# Patient Record
Sex: FEMALE | Race: WHITE | Employment: FULL TIME | URBAN - METROPOLITAN AREA
[De-identification: names, ages, dates, MRNs, and addresses within clinical notes are randomized per-mention and may not be internally consistent; named-entity substitution may affect disease eponyms.]

---

## 2022-02-14 ENCOUNTER — TELEPHONE (OUTPATIENT)
Dept: PHARMACY | Age: 57
End: 2022-02-14

## 2022-03-03 ENCOUNTER — HOSPITAL ENCOUNTER (OUTPATIENT)
Dept: PHARMACY | Age: 57
Setting detail: THERAPIES SERIES
Discharge: HOME | End: 2022-03-03
Payer: COMMERCIAL

## 2022-03-03 DIAGNOSIS — Z95.2 HX OF AORTIC VALVE REPLACEMENT: ICD-10-CM

## 2022-03-03 LAB
INR BLD: 2.2
PROTIME: 25.8 SECONDS

## 2022-03-03 PROCEDURE — 99202 OFFICE O/P NEW SF 15 MIN: CPT

## 2022-03-03 PROCEDURE — 85610 PROTHROMBIN TIME: CPT

## 2022-03-03 NOTE — PROGRESS NOTES
Management Service, Warfarin Management  [facility] Medication Management, [phone]  Visit Date: 3/3/2022   Subjective:   Elian Hines is a 55 y.o. female who presents to clinic today for anticoagulation monitoring and adjustment. Initial visit within a [address] [place] [address] facility for warfarin     Patient was referred for warfarin management due to  Indication:   AVR - on-x valve replacement. INR goal: of 2.0-3.0  -waiting on confirmation for Dr. Donaldson New office . Duration of therapy: indefinite.     Patient reports the following:   Adherent with regimen:  Yes  Missed or extra doses:  none  Bleeding or thromboembolic side effects:  None  Significant medication, dietary, alcohol, or tobacco changes: The patient is losing weight  Significant recent illness, disease state changes, or hospitalization:  None  Upcoming surgeries or procedures:  None           Assessment and PLAN   PT/INR done in office per protocol. INR today is 2.2, therapeutic. Patient believes they normally like her closer to 3 but we are confirming with previous office and provider what patient specific goal will be     Plan:  Instructed the patient to take a boost dose tonight of 9mg; then continue on her current warfarin regimen of 9 mg Wed, Sat and 6 mg on all other days. Using warfarin 3 mg tablets.     Recheck INR in 3 week(s).      Patient verbalized understanding of dosing directions and information discussed. Dosing schedule given to patient. Progress note sent to referring office. Patient acknowledges working in consult agreement with pharmacist as referred by his/her physician.    [address] Tracking Only     · Intervention Detail:   · Total # of Interventions Recommended: 1  · Total # of Interventions Accepted: 1  · Time Spent (min): 20      Nena Barbosa PharmD  3/3/2022 4:53 PM

## 2022-03-24 ENCOUNTER — HOSPITAL ENCOUNTER (OUTPATIENT)
Dept: PHARMACY | Age: 57
Setting detail: THERAPIES SERIES
Discharge: HOME | End: 2022-03-24
Payer: COMMERCIAL

## 2022-03-24 LAB
INR BLD: 1.9
PROTIME: 23.1 SECONDS

## 2022-03-24 PROCEDURE — 99211 OFF/OP EST MAY X REQ PHY/QHP: CPT

## 2022-03-24 PROCEDURE — 85610 PROTHROMBIN TIME: CPT

## 2022-03-24 NOTE — PROGRESS NOTES
Management Service, Warfarin Management  [facility] Medication Management, [phone]  Visit Date: 3/24/2022   Subjective:   Beto Thorne a 55 y. o. female who presents to clinic today for anticoagulation monitoring and adjustment. Initial visit within a [address] [place] [address] facility for warfarin     Patient was referred for warfarin management due to  [address] - on-x valve replacement. INR goal: of 2.0-3.0  -Dr Wilber Mccartney office would like more specifically between 2.0& 2.5   Duration of therapy: indefinite.     Patient reports the following:   Adherent with regimen:  Yes  Missed or extra doses:  none  Bleeding or thromboembolic side effects:  None  Significant medication, dietary, alcohol, or tobacco changes:  The patient is losing weight  Significant recent illness, disease state changes, or hospitalization:  None  Upcoming surgeries or procedures:  None           Assessment and PLAN   PT/INR done in office per protocol.     INR today is 1.9, sub-therapeutic. Plan: Going to increase patient warfarin regimen by ~12% to maintain INR within range. New dosage of warfarin 6mg MWF; 9mg all other days of the week. Using warfarin 3 mg tablets.     Recheck INR in 3 week(s).      Patient verbalized understanding of dosing directions and information discussed. Dosing schedule given to patient. Progress note sent to referring office. Patient acknowledges working in consult agreement with pharmacist as referred by his/her physician.             For Pharmacy Admin Tracking Only     · Intervention Detail:   · Total # of Interventions Recommended: 1  · Total # of Interventions Accepted: 1  · Time Spent (min): 20        Emilia Chin.  Vidal Barbosa  3/24/2022 2:56 PM

## 2022-04-15 ENCOUNTER — HOSPITAL ENCOUNTER (OUTPATIENT)
Dept: PHARMACY | Age: 57
Setting detail: THERAPIES SERIES
Discharge: HOME | End: 2022-04-15
Payer: COMMERCIAL

## 2022-04-15 DIAGNOSIS — Z95.2 HX OF AORTIC VALVE REPLACEMENT: Primary | ICD-10-CM

## 2022-04-15 LAB
INR BLD: 3.2
PROTIME: 37.9 SECONDS

## 2022-04-15 PROCEDURE — 99212 OFFICE O/P EST SF 10 MIN: CPT

## 2022-04-15 PROCEDURE — 85610 PROTHROMBIN TIME: CPT

## 2022-04-15 NOTE — PROGRESS NOTES
Medication Management Service, Warfarin Management  [facility] Medication Management, [phone]  Visit Date: 4/15/2022   Subjective:   Angela Subramanian is a 55 y.o. female who presents to clinic today for anticoagulation monitoring and adjustment. Patient was referred for warfarin management due to  Indication:   valve replacement. INR goal: of 2.0 to 2.5. Duration of therapy: indefinite. Patient reports the following:   Adherent with regimen:  Yes  Missed or extra doses:  None   Bleeding or thromboembolic side effects:  None  Significant medication, dietary, alcohol, or tobacco changes:  None  Significant recent illness, disease state changes, or hospitalization:  None  Upcoming surgeries or procedures:  None           Assessment and PLAN   PT/INR done in office per protocol. INR today is 3.2, supratherapeutic. Plan:  Instructed the patient to take warfarin 3 mg today and then start a reduced weekly warfarin regimen of 9 mg Mon, Wed, Fri and 6 mg on all other days. Using warfarin 3 mg tablets. Recheck INR in 2 week(s). Patient verbalized understanding of dosing directions and information discussed. Dosing schedule given to patient. Progress note sent to referring office. Patient acknowledges working in consult agreement with pharmacist as referred by his/her physician.       Electronically signed by Serena Gan, [address] [place] [address] on 4/15/22 at 10:11 AM EDT    For Pharmacy Admin Tracking Only     Intervention Detail: Dose Adjustment: 1, reason: Therapy Optimization   Total # of Interventions Recommended: 1   Total # of Interventions Accepted: 1   Time Spent (min): 15

## 2022-04-29 ENCOUNTER — HOSPITAL ENCOUNTER (OUTPATIENT)
Dept: PHARMACY | Age: 57
Setting detail: THERAPIES SERIES
Discharge: HOME | End: 2022-04-29
Payer: COMMERCIAL

## 2022-04-29 DIAGNOSIS — Z95.2 HX OF AORTIC VALVE REPLACEMENT: Primary | ICD-10-CM

## 2022-04-29 LAB — INR BLD: 3.2

## 2022-04-29 PROCEDURE — 85610 PROTHROMBIN TIME: CPT

## 2022-04-29 PROCEDURE — 99212 OFFICE O/P EST SF 10 MIN: CPT

## 2022-04-29 NOTE — PROGRESS NOTES
Medication Management Service, Warfarin Management  [facility] Medication Management, [phone]  Visit Date: 4/29/2022   Subjective:   Delilah Artis is a 55 y.o. female who presents to clinic today for anticoagulation monitoring and adjustment. Patient was referred for warfarin management due to  Indication:   AVR. INR goal: of 2.0 to 2.5. Duration of therapy: indefinite. Patient reports the following:   Adherent with regimen:  Yes  Missed or extra doses:  None   Bleeding or thromboembolic side effects: The patient has a bruise on her inner arm from yardwork. The bruise dose appear to be improving. Significant medication, dietary, alcohol, or tobacco changes:  None  Significant recent illness, disease state changes, or hospitalization:  None  Upcoming surgeries or procedures:  None           Assessment and PLAN   PT/INR done in office per protocol. INR today is 3.2, supratherapeutic. Plan:  Instructed the patient to start a decreased warfarin regimen of 9 mg Mon, Thur and 6 mg on all other days. Using warfarin 3 mg tablets. Recheck INR in 2 week(s). Patient verbalized understanding of dosing directions and information discussed. Dosing schedule given to patient. Progress note sent to referring office. Patient acknowledges working in consult agreement with pharmacist as referred by his/her physician.       Electronically signed by ERI Vanegas [facility] on 4/29/22 at 3:25 PM EDT    For Pharmacy Admin Tracking Only     Intervention Detail: Dose Adjustment: 1, reason: Therapy Optimization   Total # of Interventions Recommended: 1   Total # of Interventions Accepted: 1   Time Spent (min): 15

## 2022-05-12 ENCOUNTER — HOSPITAL ENCOUNTER (OUTPATIENT)
Dept: PHARMACY | Age: 57
Setting detail: THERAPIES SERIES
Discharge: HOME | End: 2022-05-12
Payer: COMMERCIAL

## 2022-05-12 DIAGNOSIS — Z95.2 HX OF AORTIC VALVE REPLACEMENT: Primary | ICD-10-CM

## 2022-05-12 LAB
INR BLD: 2.6
PROTIME: 31 SECONDS

## 2022-05-12 PROCEDURE — 85610 PROTHROMBIN TIME: CPT

## 2022-05-12 PROCEDURE — 99211 OFF/OP EST MAY X REQ PHY/QHP: CPT

## 2022-05-12 NOTE — PROGRESS NOTES
Medication Management Service, Warfarin Management  [facility] Medication Management, [phone]  Visit Date: 5/12/2022   Subjective:   Martin Rogel is a 55 y.o. female who presents to clinic today for anticoagulation monitoring and adjustment.     Patient was referred for warfarin management due to  Indication:   AVR. INR goal: of 2.0 to 2.5. Duration of therapy: indefinite.     Patient reports the following:   Adherent with regimen:  Yes  Missed or extra doses:  None   Bleeding or thromboembolic side effects: The patient has a bruise on her inner arm from yardwork. The bruise dose appear to be improving. Significant medication, dietary, alcohol, or tobacco changes:  None  Significant recent illness, disease state changes, or hospitalization:  None  Upcoming surgeries or procedures:  None           Assessment and PLAN   PT/INR done in office per protocol. INR today is 2.6, slightly supra-therapeutic for provider specific goal .     Plan:  Instructed the patient to continue warfarin regimen of 9 mg Mon, Thur and 6 mg on all other days. Using warfarin 3 mg tablets.     Recheck INR in 4 week(s).      Patient verbalized understanding of dosing directions and information discussed. Dosing schedule given to patient. Progress note sent to referring office. Patient acknowledges working in consult agreement with pharmacist as referred by his/her physician.      [address] [place] [address] Tracking Only     · Intervention Detail: Dose Adjustment: 0, reason: Therapy Optimization  · Total # of Interventions Recommended: 0  · Total # of Interventions Accepted: 0  · Time Spent (min): 15      Ganesh Barbosa,   PharmD  5/12/2022 12:59 PM

## 2022-06-09 ENCOUNTER — HOSPITAL ENCOUNTER (OUTPATIENT)
Dept: PHARMACY | Age: 57
Setting detail: THERAPIES SERIES
Discharge: HOME | End: 2022-06-09
Payer: COMMERCIAL

## 2022-06-09 DIAGNOSIS — Z95.2 HX OF AORTIC VALVE REPLACEMENT: Primary | ICD-10-CM

## 2022-06-09 LAB
INR BLD: 2.3
PROTIME: 28.2 SECONDS

## 2022-06-09 PROCEDURE — 99212 OFFICE O/P EST SF 10 MIN: CPT

## 2022-06-09 PROCEDURE — 85610 PROTHROMBIN TIME: CPT

## 2022-06-09 RX ORDER — WARFARIN SODIUM 6 MG/1
TABLET ORAL
Qty: 100 TABLET | Refills: 1 | Status: SHIPPED | OUTPATIENT
Start: 2022-06-09 | End: 2022-06-09 | Stop reason: DRUGHIGH

## 2022-06-09 RX ORDER — WARFARIN SODIUM 6 MG/1
TABLET ORAL
Qty: 100 TABLET | Refills: 1 | Status: SHIPPED | OUTPATIENT
Start: 2022-06-09 | End: 2022-06-09 | Stop reason: SDUPTHER

## 2022-06-09 RX ORDER — WARFARIN SODIUM 6 MG/1
TABLET ORAL
Qty: 100 TABLET | Refills: 1 | OUTPATIENT
Start: 2022-06-09

## 2022-06-09 NOTE — PROGRESS NOTES
Medication Management Service, Warfarin Management  [facility] Medication Management, [phone]  Visit Date: 6/9/2022   Subjective:   John Alaniz is a 55 y.o. female who presents to clinic today for anticoagulation monitoring and adjustment. Patient was referred for warfarin management due to  Indication:   AVR. INR goal: of 2.0 to 2.5. Duration of therapy: indefinite. Patient reports the following:   Adherent with regimen:  Yes  Missed or extra doses:  None   Bleeding or thromboembolic side effects: The patient did have a fall and bruised afterwards. The bruises have improved as of now. Significant medication, dietary, alcohol, or tobacco changes:  None  Significant recent illness, disease state changes, or hospitalization:  None  Upcoming surgeries or procedures:  None           Assessment and PLAN   PT/INR done in office per protocol. INR today is 2.3, therapeutic. Plan:  Instructed the patient to continue the current warfarin regimen of 9 mg Mon, Thur and 6 mg on all other days. Using warfarin 3 mg tablets. Recheck INR in 4 week(s). Patient verbalized understanding of dosing directions and information discussed. Dosing schedule given to patient. Progress note sent to referring office. Patient acknowledges working in consult agreement with pharmacist as referred by his/her physician.       Electronically signed by Branden Connolly, [facility] on 6/9/22 at 11:44 AM EDT    For Pharmacy Admin Tracking Only     Intervention Detail: Refill(s) Provided   Total # of Interventions Recommended: 1   Total # of Interventions Accepted: 1   Time Spent (min): 15

## 2022-07-07 ENCOUNTER — HOSPITAL ENCOUNTER (OUTPATIENT)
Dept: PHARMACY | Age: 57
Setting detail: THERAPIES SERIES
Discharge: HOME | End: 2022-07-07
Payer: COMMERCIAL

## 2022-07-07 DIAGNOSIS — Z95.2 HX OF AORTIC VALVE REPLACEMENT: Primary | ICD-10-CM

## 2022-07-07 LAB — INR BLD: 2.6

## 2022-07-07 PROCEDURE — 99211 OFF/OP EST MAY X REQ PHY/QHP: CPT

## 2022-07-07 PROCEDURE — 85610 PROTHROMBIN TIME: CPT

## 2022-07-07 NOTE — PROGRESS NOTES
Medication Management Service, Warfarin Management  [facility] Medication Management, [phone]  Visit Date: 7/7/2022   Subjective:   Gee Macdonald is a 55 y.o. female who presents to clinic today for anticoagulation monitoring and adjustment. Patient was referred for warfarin management due to  Indication:   mechanical aortic valve. INR goal: of 2.0-2.5. Duration of therapy: indefinite. Patient reports the following:   Adherent with regimen:  Yes  Missed or extra doses:  None   Bleeding or thromboembolic side effects:  None  Significant medication, dietary, alcohol, or tobacco changes:  None  Significant recent illness, disease state changes, or hospitalization:  None  Upcoming surgeries or procedures:  None           Assessment and PLAN   PT/INR done in office per protocol. INR today is 2.6, slightly supratherapeutic. Plan:  Instructed the patient to continue the current warfarin regimen of 9 mg Mon, Thur and 6 mg on all other days. Using warfarin 3 mg tablets. Recheck INR in 4 week(s). Patient verbalized understanding of dosing directions and information discussed. Dosing schedule given to patient. Progress note sent to referring office. Patient acknowledges working in consult agreement with pharmacist as referred by his/her physician.       Electronically signed by Juan Frank [facility] on 7/7/22 at 11:50 AM EDT

## 2022-08-08 ENCOUNTER — HOSPITAL ENCOUNTER (OUTPATIENT)
Dept: PHARMACY | Age: 57
Setting detail: THERAPIES SERIES
Discharge: HOME | End: 2022-08-08
Payer: COMMERCIAL

## 2022-08-08 DIAGNOSIS — Z95.2 HX OF AORTIC VALVE REPLACEMENT: Primary | ICD-10-CM

## 2022-08-08 LAB
INR BLD: 2.5
PROTIME: 30.4 SECONDS

## 2022-08-08 PROCEDURE — 85610 PROTHROMBIN TIME: CPT

## 2022-08-08 PROCEDURE — 99211 OFF/OP EST MAY X REQ PHY/QHP: CPT

## 2022-08-08 NOTE — PROGRESS NOTES
Medication Management Service, Warfarin Management  [facility] Medication Management, [phone]  Visit Date: 8/8/2022   Subjective:   Denise Cobb is a 55 y.o. female who presents to clinic today for anticoagulation monitoring and adjustment. Patient was referred for warfarin management due to  Indication:    AVR . INR goal: of  2 to 2.5 . Duration of therapy: indefinite. Patient reports the following:   Adherent with regimen:  Yes  Missed or extra doses: The patient reports missing a dose about a week to 2 weeks ago but took it the next day. Bleeding or thromboembolic side effects:  None  Significant medication, dietary, alcohol, or tobacco changes: The patient was on the \"Reset\" cleanse last week for 2-3 days. The patient normally does have vit K foods daily. Significant recent illness, disease state changes, or hospitalization:  None  Upcoming surgeries or procedures:  None           Assessment and PLAN   PT/INR done in office per protocol. INR today is 2.5, therapeutic. Plan:  Instructed the patient to continue the current warfarin regimen of 9 mg Mon, Thur, and 6 mg on all other days. Using warfarin 3 mg and 6 mg tablets. Recheck INR in 5 week(s). Patient verbalized understanding of dosing directions and information discussed. Dosing schedule given to patient. Progress note sent to referring office. Patient acknowledges working in consult agreement with pharmacist as referred by his/her physician.       Electronically signed by Allan Regalado [facility] on 8/8/22 at 1:03 PM EDT    For Pharmacy Admin Tracking Only    Intervention Detail:   Total # of Interventions Recommended: 0  Total # of Interventions Accepted: 0  Time Spent (min): 15

## 2022-09-15 ENCOUNTER — HOSPITAL ENCOUNTER (OUTPATIENT)
Dept: PHARMACY | Age: 57
Setting detail: THERAPIES SERIES
Discharge: HOME | End: 2022-09-15
Payer: COMMERCIAL

## 2022-09-15 DIAGNOSIS — Z95.2 HX OF AORTIC VALVE REPLACEMENT: Primary | ICD-10-CM

## 2022-09-15 LAB
INR BLD: 2.2
PROTIME: 26.8 SECONDS

## 2022-09-15 PROCEDURE — 99211 OFF/OP EST MAY X REQ PHY/QHP: CPT

## 2022-09-15 PROCEDURE — 85610 PROTHROMBIN TIME: CPT

## 2022-09-15 NOTE — PROGRESS NOTES
Medication Management Service, Warfarin Management  [facility] Medication Management, [phone]  Visit Date: 9/15/2022   Subjective:   Sania Sanchez is a 55 y.o. female who presents to clinic today for anticoagulation monitoring and adjustment. Patient was referred for warfarin management due to  Indication:   bio prosthetic valve replacement. INR goal: of  2.0-2.5 . Duration of therapy: indefinite. Patient reports the following:   Adherent with regimen:  Yes  Missed or extra doses:  None   Bleeding or thromboembolic side effects:  None  Significant medication, dietary, alcohol, or tobacco changes:  None  Significant recent illness, disease state changes, or hospitalization:  None  Upcoming surgeries or procedures:  None           Assessment and PLAN   PT/INR done in office per protocol. INR today is 2.2, therapeutic. Plan:  Instructed the patient to continue the current warfarin regimen of 9 mg Mon, Thur, and 6 mg on all other days. Using warfarin 3 mg tablets. Recheck INR in 6 week(s). Patient verbalized understanding of dosing directions and information discussed. Dosing schedule given to patient. Progress note sent to referring office. Patient acknowledges working in consult agreement with pharmacist as referred by his/her physician.       Electronically signed by Katina Disla [facility] on 9/15/22 at 12:57 PM EDT

## 2022-10-28 ENCOUNTER — HOSPITAL ENCOUNTER (OUTPATIENT)
Dept: PHARMACY | Age: 57
Setting detail: THERAPIES SERIES
Discharge: HOME | End: 2022-10-28
Payer: COMMERCIAL

## 2022-10-28 DIAGNOSIS — Z95.2 HX OF AORTIC VALVE REPLACEMENT: Primary | ICD-10-CM

## 2022-10-28 LAB
INR BLD: 3
PROTIME: 36.1 SECONDS

## 2022-10-28 PROCEDURE — 99211 OFF/OP EST MAY X REQ PHY/QHP: CPT

## 2022-10-28 PROCEDURE — 85610 PROTHROMBIN TIME: CPT

## 2022-10-28 NOTE — PROGRESS NOTES
Medication Management Service, Warfarin Management  [facility] Medication Management, [phone]  Visit Date: 10/28/2022   Subjective:   Rafal Campos is a 65 y.o. female who presents to clinic today for anticoagulation monitoring and adjustment. Patient was referred for warfarin management due to  Indication:   bio prosthetic valve replacement. INR goal: of  2.0-2.5 . Duration of therapy: indefinite. Patient reports the following:   Adherent with regimen:  Yes  Missed or extra doses:  None   Bleeding or thromboembolic side effects:  None  Significant medication, dietary, alcohol, or tobacco changes:  None  Significant recent illness, disease state changes, or hospitalization:  None  Upcoming surgeries or procedures:  None           Assessment and PLAN   PT/INR done in office per protocol. INR today is 3.0, supratherapeutic. Patient recently had COVID and is currently on a steroid for cough. Plan:  Instructed the patient to start a decreased warfarin regimen of 6 mg daily. Using warfarin 3 mg tablets. Recheck INR in 2 week(s). Patient verbalized understanding of dosing directions and information discussed. Dosing schedule given to patient. Progress note sent to referring office. Patient acknowledges working in consult agreement with pharmacist as referred by his/her physician.       Electronically signed by ERI [facility] on 9/15/22 at 12:57 PM EDT

## 2022-11-23 ENCOUNTER — HOSPITAL ENCOUNTER (OUTPATIENT)
Dept: PHARMACY | Age: 57
Setting detail: THERAPIES SERIES
Discharge: HOME | End: 2022-11-23

## 2022-11-23 DIAGNOSIS — Z95.2 HX OF AORTIC VALVE REPLACEMENT: Primary | ICD-10-CM

## 2022-11-23 LAB
INR BLD: 1.8
PROTIME: 21.8 SECONDS

## 2022-12-19 ENCOUNTER — TELEPHONE (OUTPATIENT)
Dept: PHARMACY | Age: 57
End: 2022-12-19

## 2022-12-19 NOTE — TELEPHONE ENCOUNTER
Patient is overdue for an appointment. It appears she has established with another clinic.   Resolving episode and closing referral.

## 2024-11-29 ENCOUNTER — HOSPITAL ENCOUNTER (OUTPATIENT)
Dept: PHYSICAL THERAPY | Facility: CLINIC | Age: 59
Setting detail: THERAPIES SERIES
Discharge: HOME | End: 2024-11-29
Payer: COMMERCIAL

## 2024-11-29 PROCEDURE — 97161 PT EVAL LOW COMPLEX 20 MIN: CPT

## 2024-11-29 PROCEDURE — 97140 MANUAL THERAPY 1/> REGIONS: CPT

## 2024-11-29 PROCEDURE — 97110 THERAPEUTIC EXERCISES: CPT

## 2024-11-29 NOTE — CONSULTS
PLEASE REVIEW AND SIGN THE ATTACHED PHYSICAL THERAPY EVALUATION/PLAN OF CARE OR PROGRESS REPORT AND FAX BACK [phone]      [x] [facility]  Outpatient Rehabilitation &  Therapy  [address]  P: [phone]  F: [phone]     Physical Therapy Lower Extremity Evaluation    Date:  2024  Patient: Abbey Romo  : 1965  MRN: [id number]  Physician:     Sergey Flores DPM   Insurance: MMO, visits  no auth req, ded 3500/met, 20% co-ins, oop 8000/met   Medical Diagnosis: M72.2 (ICD-10-CM) - Plantar fascial fibromatosis     Rehab Codes: M79.761- Right foot pain  Onset date: 2024    Next 's appt.: Pending    Subjective:   CC: Ms. Romo is a pleasant 60 y/o female who reports pain related to plantar fasciitis occurring over the past 4-6 months.  She currently is very active and participates in cross fit training 4-5 days/ week and reports participating in resistance training, running, and plyometrics.  She reports that her pain is worst in the morning and improves as the day goes along.  She has been actively using night splints and inserts, but denies improvements.  She denies numbness or tingling and notes that she would like to return to full cross fit training without restriction due to her foot pain.   HPI: 2024    PMHx: [] Unremarkable [] Diabetes [] HTN  [] Pacemaker   [x] MI/Heart Problems [] Cancer [] Arthritis [x] Other: Hx of seizures (on medication), carpal tunnel, presence of pacemaker,               [x] Refer to full medical chart  In EPIC       Comorbidities:   [] Obesity [] Dialysis  [] N/A   [] Asthma/COPD [] Dementia [x] Other:   [] Stroke [] Sleep apnea [] Other:   [] Vascular disease [] Rheumatic disease [] Other:     Tests: [] X-Ray: [] MRI:  [x] Other: Unrelated to current condition    Medications: [x] Refer to full medical record [] None [] Other:  Allergies:      [x] Refer to full medical record [] None [] Other:    Function  Home

## 2024-12-10 ENCOUNTER — HOSPITAL ENCOUNTER (OUTPATIENT)
Dept: PHYSICAL THERAPY | Facility: CLINIC | Age: 59
Setting detail: THERAPIES SERIES
Discharge: HOME | End: 2024-12-10
Payer: COMMERCIAL

## 2024-12-10 PROCEDURE — 97110 THERAPEUTIC EXERCISES: CPT

## 2024-12-10 PROCEDURE — 97140 MANUAL THERAPY 1/> REGIONS: CPT

## 2024-12-10 NOTE — FLOWSHEET NOTE
[x] [facility]  Outpatient Rehabilitation &  Therapy  [address]  P: [phone]  F: [phone]     Physical Therapy Daily Treatment Note    Date:  12/10/2024  Patient Name:  Abbey Romo    :  1965  MRN: [id number]  Physician:     Sergey Flores DPM   Insurance: MMO, visits 40/ no auth req, ded 3500/met, 20% co-ins, oop 8000/met   Medical Diagnosis: M72.2 (ICD-10-CM) - Plantar fascial fibromatosis                     Rehab Codes: M79.761- Right foot pain  Onset date: 2024                       Next Dr's appt.: Pending  Visit# / total visits:      Cancels/No Shows: 0/0    Subjective:  Pt reports that she has been sick the past few days and has not been as active due to sickness; reports that her foot pain is improved.  Pain:  [x] Yes  [] No Location: R foot N/A Pain Rating: (0-10 scale) 4/10  Pain altered Tx:  [x] No  [] Yes  Action:  Comments:    Objective:  Modalities: No e-stim or tens  Precautions: Presence of pacemaker, hx of seizures (has not occurred within past years)   Exercises:  Exercise Performed Reps/ Time Weight/ Level Comments   Elliptical x 8' L1    SB calf stretch x 3x30\"     Soleus stretch x 3x30\" ea            Clamshell x x30 Lime TB    SL hip abduction x x20 Lime TB    Bridge x 3x10 Lime TB           Towel scrunch x x20     Arch lift x x20     Toe yoga x x20            Lateral band walk x 3x10 steps Lime TB                                                                   Other:    Manual therapy:   15'  STM to lateral plantar fascia  STM to lateral calf and soleus musculature     Specific Instructions for next treatment: Manual therapy PRN with foot intrinsic/ mobility and hip strength/ endurance as needed      Treatment Charges: Mins Units Time In/Out   []  Modalities        [x]  Ther Exercise 39 3 4:06-4:45 PM   []  Neuromuscular Re-ed      []  Gait Training      [x]  Manual Therapy 15 1 3:51-4:06 PM   []  Ther Activities      []  Aquatics

## 2024-12-13 ENCOUNTER — HOSPITAL ENCOUNTER (OUTPATIENT)
Dept: PHYSICAL THERAPY | Facility: CLINIC | Age: 59
Setting detail: THERAPIES SERIES
Discharge: HOME | End: 2024-12-13
Payer: COMMERCIAL

## 2024-12-13 PROCEDURE — 97110 THERAPEUTIC EXERCISES: CPT

## 2024-12-13 PROCEDURE — 97140 MANUAL THERAPY 1/> REGIONS: CPT

## 2024-12-13 NOTE — FLOWSHEET NOTE
[x] [facility]  Outpatient Rehabilitation &  Therapy  [address]  P: [phone]  F: [phone]     Physical Therapy Daily Treatment Note    Date:  2024  Patient Name:  Abbey Romo    :  1965  MRN: [id number]  Physician:     Sergey Flores DPM   Insurance: MMO, visits 40/ no auth req, ded 3500/met, 20% co-ins, oop 8000/met   Medical Diagnosis: M72.2 (ICD-10-CM) - Plantar fascial fibromatosis                     Rehab Codes: M79.761- Right foot pain  Onset date: 2024                       Next Dr's appt.: Pending  Visit# / total visits: 3/16     Cancels/No Shows: 0/0    Subjective:  Pt reports that she is starting to get over a cold and plans to go back to crossfit this weekend or next week.  Pain:  [x] Yes  [] No Location: R foot N/A Pain Rating: (0-10 scale) 3/10  Pain altered Tx:  [x] No  [] Yes  Action:  Comments:    Objective:  Modalities: No e-stim or tens  Precautions: Presence of pacemaker, hx of seizures (has not occurred within past years)   Exercises:  Exercise Performed Reps/ Time Weight/ Level Comments   Aidryne bike x 8'     Elliptical  8' L1    SB calf stretch x 3x30\"     Soleus stretch x 3x30\" ea     Hamstring stretch x 3x30\" ea            Clamshell x x30 Lime TB    SL hip abduction x x30 Lime TB    Bridge x 3x10 Lime TB           Towel scrunch x x20     Arch lift x x20     Toe yoga x x20            Lateral band walk x 3x10 steps Lime TB    3-way kick x X10 ea Lime TB                                                            Other:    Manual therapy:   10'  STM to lateral plantar fascia  STM to lateral calf and soleus musculature  Grade IV posterior talus glide     Specific Instructions for next treatment: Manual therapy PRN with foot intrinsic/ mobility and hip strength/ endurance as needed      Treatment Charges: Mins Units Time In/Out   []  Modalities        [x]  Ther Exercise 44 3 7:12-7:56 AM   []  Neuromuscular Re-ed      []  Gait

## 2024-12-17 ENCOUNTER — HOSPITAL ENCOUNTER (OUTPATIENT)
Dept: PHYSICAL THERAPY | Facility: CLINIC | Age: 59
Setting detail: THERAPIES SERIES
Discharge: HOME | End: 2024-12-17
Payer: COMMERCIAL

## 2024-12-17 PROCEDURE — 97110 THERAPEUTIC EXERCISES: CPT

## 2024-12-17 NOTE — FLOWSHEET NOTE
[x] [facility]  Outpatient Rehabilitation &  Therapy  [address]  P: [phone]  F: [phone]     Physical Therapy Daily Treatment Note    Date:  2024  Patient Name:  Abbey Romo    :  1965  MRN: [id number]  Physician:     Sergey Flores DPM   Insurance: MMO, visits / no auth req, ded 3500/met, 20% co-ins, oop 8000/met   Medical Diagnosis: M72.2 (ICD-10-CM) - Plantar fascial fibromatosis                     Rehab Codes: M79.761- Right foot pain  Onset date: 2024                       Next Dr's appt.: Pending  Visit# / total visits:      Cancels/No Shows: 0/0    Subjective:  Pt reports that her pain is decreased today and that she was able to attend crossfit this weekend without difficulty.   Pain:  [x] Yes  [] No Location: R foot N/A Pain Rating: (0-10 scale) 2/10  Pain altered Tx:  [x] No  [] Yes  Action:  Comments:    Objective:  Modalities: No e-stim or tens  Precautions: Presence of pacemaker, hx of seizures (has not occurred within past years)   Exercises:  Exercise Performed Reps/ Time Weight/ Level Comments   Aidryne bike x 8'     Elliptical  8' L1    SB calf stretch x 3x30\"     Soleus stretch x 3x30\" ea     Hamstring stretch x 3x30\" ea            Clamshell x x30 Lime TB    SL hip abduction x x30 Lime TB    Bridge x 3x10 Lime TB    SL bridge x X10 ea  10\" hold          Towel scrunch  x20     Arch lift  x20     Toe yoga  x20            Lateral band walk x 3x10 steps Lime TB    3-way kick x X15 ea Lime TB                                                            Other:    Manual therapy: held today  10'  STM to lateral plantar fascia  STM to lateral calf and soleus musculature  Grade IV posterior talus glide     Specific Instructions for next treatment: Manual therapy PRN with foot intrinsic/ mobility and hip strength/ endurance as needed      Treatment Charges: Mins Units Time In/Out   []  Modalities        [x]  Ther Exercise 45 3 4:10-4:55 PM

## 2024-12-20 ENCOUNTER — HOSPITAL ENCOUNTER (OUTPATIENT)
Dept: PHYSICAL THERAPY | Facility: CLINIC | Age: 59
Setting detail: THERAPIES SERIES
Discharge: HOME | End: 2024-12-20
Payer: COMMERCIAL

## 2024-12-20 ENCOUNTER — CLINICAL DOCUMENTATION (OUTPATIENT)
Dept: PHYSICAL THERAPY | Facility: CLINIC | Age: 59
End: 2024-12-20

## 2024-12-20 NOTE — THERAPY DISCHARGE
THIS PATIENT HAS BEEN DISCHARGED FROM PHYSICAL THERAPY. HERE IS A COPY FOR YOUR RECORDS. IF YOU HAVE ANY QUESTIONS, PLEASE DO NOT HESITATE TO CALL. THANK YOU FOR THIS REFERRAL      [x] [facility]  Outpatient Rehabilitation &  Therapy  [address]  P: [phone]  F: [phone]     Physical Therapy Discharge Note    Date: 2024      Patient: Abbey Romo  : 1965  MRN: [id number]    Physician:     Sergey Flores DPM   Insurance: MMO, visits  no auth req, ded 3500/met, 20% co-ins, oop 8000/met   Medical Diagnosis: M72.2 (ICD-10-CM) - Plantar fascial fibromatosis                     Rehab Codes: M79.761- Right foot pain  Onset date: 2024                       Next 's appt.: Pending  Visit# / total visits:                                   Cancels/No Shows:   Date of initial visit:                 Date of final visit:        Discharge Status:     Pt called to request to be discharged due to needing to meet a new deductible at the start of the year.  Pt welcome to come back with new referral as needed if she wishes to return to therapy after reaching deductible.             Electronically signed by: ARELIS LEDBETTER PT    If you have any questions or concerns, please don't hesitate to call.  Thank you for your referral.

## 2024-12-20 NOTE — FLOWSHEET NOTE
[x] [facility]  Outpatient Rehabilitation &  Therapy  [address]  P: [phone]  F: [phone]     Therapy Cancel/No Show note    Date: 2024  Patient: Abbey Romo  : 1965  MRN: [id number]    Cancels/No Shows to date:     For today's appointment patient:    [x]  Cancelled    [] Rescheduled appointment    [] No-show     Reason given by patient:    []  Patient ill    []  Conflicting appointment    [] No transportation      [] Conflict with work    [] No reason given    [] Weather related    [] COVID-19    [x] Other:      Comments:  )Pt called to cancel today's appointment and wishes to be discharged due to having to meet a new deductible at the new year.       [] Next appointment was confirmed    Electronically signed by: ARELIS LEDBETTER, PT

## 2024-12-26 NOTE — PROGRESS NOTES
Medication Management Service, Warfarin Management  [facility] Medication Management, [phone]  Visit Date: 1/3/2025   Subjective:   Abbey Rmoo is a 59 y.o. female who presents to clinic today for initial appointment with [facility].  Patient was previously managed by [facility] and was therapeutic on a warfarin regimen of 9mg on [id number], 6mg all other days.       Patient was referred for warfarin management due to  Indication:   atrial fibrillation and History of mechanical aortic valve replacement .   INR goal: of 2.0-3.0.  Duration of therapy: indefinite.    Patient reports the following:   Adherent with regimen:  Yes    Missed or extra doses:  None - patient states that she uses a pillbox at home to organize warfarin, doubts that there would have been a mix up with dosing. Patient states that at [facility] her regimen was 9 mg Monday/Wednesday/Friday/Saturday, 6 mg all other days. Patient states that she self-adjusted dosing to warfarin 9 mg Monday/Wednesday/Friday, 7.5 mg on Saturday/Sunday, and 6 mg Tuesday/Thursday.     Bleeding or thromboembolic side effects:  None    Significant medication, dietary, alcohol, or tobacco changes:   Patient states that she usually has daily salads consisting of either spinach or lettuce mix, but with the holidays she has not eaten these during her lunch. She also states that she did have one beer last night with dinner and this is out of the normal for her as well. Additionally, patient also states that she was sick in December and was on an Augmentin course - but that this was over a month ago. Patient states that she also has to take Augmentin 1000 mg tablet prior to any dental cleaning.      Significant recent illness, disease state changes, or hospitalization:  None    Upcoming surgeries or procedures: Patient states that she also has to take Augmentin 1000 mg tablet prior to any dental cleaning. States

## 2025-01-03 ENCOUNTER — ANTI-COAG VISIT (OUTPATIENT)
Age: 60
End: 2025-01-03
Payer: COMMERCIAL

## 2025-01-03 DIAGNOSIS — I48.91 ATRIAL FIBRILLATION, UNSPECIFIED TYPE (HCC): ICD-10-CM

## 2025-01-03 DIAGNOSIS — Z95.2 HX OF AORTIC VALVE REPLACEMENT: Primary | ICD-10-CM

## 2025-01-03 LAB
INTERNATIONAL NORMALIZATION RATIO, POC: 6.2
PROTHROMBIN TIME, POC: 74.3

## 2025-01-03 PROCEDURE — 99213 OFFICE O/P EST LOW 20 MIN: CPT

## 2025-01-03 PROCEDURE — 85610 PROTHROMBIN TIME: CPT

## 2025-01-03 RX ORDER — UBIDECARENONE/VIT E/VIT E MIX 100-20-15
1 CAPSULE ORAL DAILY
COMMUNITY

## 2025-01-03 RX ORDER — M-VIT,TX,IRON,MINS/CALC/FOLIC 27MG-0.4MG
1 TABLET ORAL DAILY
COMMUNITY

## 2025-01-06 ENCOUNTER — ANTI-COAG VISIT (OUTPATIENT)
Age: 60
End: 2025-01-06
Payer: COMMERCIAL

## 2025-01-06 DIAGNOSIS — Z95.2 HX OF AORTIC VALVE REPLACEMENT: Primary | ICD-10-CM

## 2025-01-06 DIAGNOSIS — I48.91 ATRIAL FIBRILLATION, UNSPECIFIED TYPE (HCC): ICD-10-CM

## 2025-01-06 LAB
INTERNATIONAL NORMALIZATION RATIO, POC: 1.3
PROTHROMBIN TIME, POC: 16

## 2025-01-06 PROCEDURE — 85610 PROTHROMBIN TIME: CPT

## 2025-01-06 PROCEDURE — 99211 OFF/OP EST MAY X REQ PHY/QHP: CPT

## 2025-01-06 NOTE — PROGRESS NOTES
Medication Management Service, Warfarin Management  [facility] Medication Management, [phone]  Visit Date: 2025   Subjective:   Abbey Romo is a 59 y.o. female who presents to clinic today for anticoagulation monitoring and adjustment.    Patient was referred for warfarin management due to  Indication:   atrial fibrillation and aortic valve replacement.   INR goal: of 2.0-3.0.  Duration of therapy: indefinite.    Patient reports the following:   Adherent with regimen:  Yes  Missed or extra doses:  None   Bleeding or thromboembolic side effects:  None  Significant medication, dietary, alcohol, or tobacco changes:  None  Significant recent illness, disease state changes, or hospitalization:  None  Upcoming surgeries or procedures:  None           Assessment and PLAN   PT/INR done in office per protocol.     INR today is 1.3, subtherapeutic.    Plan:  Instructed the patient to continue the current warfarin regimen of 7.5 mg daily.  Using warfarin 6 mg tablets.    Recheck INR in 2 week(s).     Patient verbalized understanding of dosing directions and information discussed. Dosing schedule given to patient. Progress note sent to referring office.  Patient acknowledges working in consult agreement with pharmacist as referred by his/her physician.      Electronically signed by June Bal RPH on 25 at 11:30 AM EST    For Pharmacy Admin Tracking Only    Intervention Detail: Adherence Monitorin  Total # of Interventions Recommended: 0  Total # of Interventions Accepted: 0  Time Spent (min): 15

## 2025-01-21 ENCOUNTER — ANTI-COAG VISIT (OUTPATIENT)
Age: 60
End: 2025-01-21
Payer: COMMERCIAL

## 2025-01-21 DIAGNOSIS — I48.91 ATRIAL FIBRILLATION, UNSPECIFIED TYPE (HCC): ICD-10-CM

## 2025-01-21 DIAGNOSIS — Z95.2 HX OF AORTIC VALVE REPLACEMENT: Primary | ICD-10-CM

## 2025-01-21 LAB
INTERNATIONAL NORMALIZATION RATIO, POC: 2.6
PROTHROMBIN TIME, POC: 31.5

## 2025-01-21 PROCEDURE — 99211 OFF/OP EST MAY X REQ PHY/QHP: CPT

## 2025-01-21 PROCEDURE — 85610 PROTHROMBIN TIME: CPT

## 2025-01-21 NOTE — PROGRESS NOTES
Medication Management Service, Warfarin Management  [facility] Medication Management, [phone]  Visit Date: 2025   Subjective:   Abbey Romo is a 59 y.o. female who presents to clinic today for anticoagulation monitoring and adjustment.    Patient was referred for warfarin management due to  Indication:   atrial fibrillation.   INR goal: of 2.0-3.0.  Duration of therapy: indefinite.    Patient reports the following:   Adherent with regimen:  Yes  Missed or extra doses:  None   Bleeding or thromboembolic side effects:  None  Significant medication, dietary, alcohol, or tobacco changes:  None  Significant recent illness, disease state changes, or hospitalization:  None  Upcoming surgeries or procedures:  None           Assessment and PLAN   PT/INR done in office per protocol.     INR today is 2.6, therapeutic.    Plan:  Instructed the patient to continue the current warfarin regimen of 7.5 mg daily.  Using warfarin 3 mg tablets.    Recheck INR in 4 week(s).     Patient verbalized understanding of dosing directions and information discussed. Dosing schedule given to patient. Progress note sent to referring office.  Patient acknowledges working in consult agreement with pharmacist as referred by his/her physician.      Electronically signed by June Bal RPH on 25 at 11:05 AM EST    For Pharmacy Admin Tracking Only    Intervention Detail: Adherence Monitorin  Total # of Interventions Recommended: 0  Total # of Interventions Accepted: 0  Time Spent (min): 15

## 2025-02-28 ENCOUNTER — ANTI-COAG VISIT (OUTPATIENT)
Age: 60
End: 2025-02-28
Payer: COMMERCIAL

## 2025-02-28 DIAGNOSIS — Z95.2 HX OF AORTIC VALVE REPLACEMENT: Primary | ICD-10-CM

## 2025-02-28 DIAGNOSIS — I48.91 ATRIAL FIBRILLATION, UNSPECIFIED TYPE (HCC): ICD-10-CM

## 2025-02-28 LAB
INTERNATIONAL NORMALIZATION RATIO, POC: 3
PROTHROMBIN TIME, POC: 36.4

## 2025-02-28 PROCEDURE — 85610 PROTHROMBIN TIME: CPT

## 2025-02-28 PROCEDURE — 99211 OFF/OP EST MAY X REQ PHY/QHP: CPT

## 2025-02-28 NOTE — PROGRESS NOTES
Medication Management Service, Warfarin Management  [facility] Medication Management, [phone]  Visit Date: 25  Subjective:   Abbey Romo is a 59 y.o. female who presents to clinic today for anticoagulation monitoring and adjustment.     Patient was referred for warfarin management due to  Indication:   atrial fibrillation.   INR goal: of 2.0-3.0.  Duration of therapy: indefinite.     Patient reports the following:   Adherent with regimen:  Yes  Missed or extra doses:  None   Bleeding or thromboembolic side effects:  None  Significant medication, dietary, alcohol, or tobacco changes:  None  Significant recent illness, disease state changes, or hospitalization:  None  Upcoming surgeries or procedures:  None           Assessment and PLAN   PT/INR done in office per protocol.     INR today is 3.0, therapeutic.     Plan:  Instructed the patient to continue the current warfarin regimen of 7.5 mg daily.  Using warfarin 3 mg tablets.     Recheck INR in 4 week(s).      Patient verbalized understanding of dosing directions and information discussed. Dosing schedule given to patient. Progress note sent to referring office.  Patient acknowledges working in consult agreement with pharmacist as referred by his/her physician.       Electronically signed by Beny Hammonds [facility] on 25 at 9:49AM EST     For Pharmacy Admin Tracking Only     Intervention Detail: Adherence Monitorin  Total # of Interventions Recommended: 0  Total # of Interventions Accepted: 0  Time Spent (min): 15

## 2025-03-26 ENCOUNTER — ANTI-COAG VISIT (OUTPATIENT)
Age: 60
End: 2025-03-26
Payer: COMMERCIAL

## 2025-03-26 DIAGNOSIS — Z95.2 HX OF AORTIC VALVE REPLACEMENT: Primary | ICD-10-CM

## 2025-03-26 DIAGNOSIS — I48.91 ATRIAL FIBRILLATION, UNSPECIFIED TYPE (HCC): ICD-10-CM

## 2025-03-26 LAB
INTERNATIONAL NORMALIZATION RATIO, POC: 4.6
PROTHROMBIN TIME, POC: 55.6

## 2025-03-26 PROCEDURE — 85610 PROTHROMBIN TIME: CPT

## 2025-03-26 PROCEDURE — 99211 OFF/OP EST MAY X REQ PHY/QHP: CPT

## 2025-03-26 NOTE — PROGRESS NOTES
Medication Management Service, Warfarin Management  [facility] Medication Management, [phone]  Visit Date: 3/26/2025   Subjective:   Abbey Romo is a 59 y.o. female who presents to clinic today for anticoagulation monitoring and adjustment.    Patient was referred for warfarin management due to  Indication:   atrial fibrillation.   INR goal: of 2.0-3.0.  Duration of therapy: indefinite.    Patient reports the following:   Adherent with regimen:  Yes  Missed or extra doses:  None   Bleeding or thromboembolic side effects:  None  Significant medication, dietary, alcohol, or tobacco changes:  None  Significant recent illness, disease state changes, or hospitalization:  None  Upcoming surgeries or procedures:   Patient reports \"problem with pacemaker\" awaiting call from cardiologist.            Assessment and PLAN   PT/INR done in office per protocol.     INR today is 4.6, supratherapeutic.    Plan:  Instructed the patient to hold 1 doses, then 7.5 mg Mon, Wed and Fri and 6 mg all other days, which is a decrease of 11%.  Using warfarin 3 mg tablets.    Recheck INR in 1 week(s).     Patient verbalized understanding of dosing directions and information discussed. Dosing schedule given to patient. Progress note sent to referring office.  Patient acknowledges working in consult agreement with pharmacist as referred by his/her physician.      Electronically signed by ANISA BRUNO RPH on 3/26/25 at 7:33 AM EDT    For Pharmacy Admin Tracking Only    Intervention Detail: Adherence Monitorin and Dose Adjustment: 1, reason: Therapy Optimization  Total # of Interventions Recommended: 1  Total # of Interventions Accepted: 1  Time Spent (min): 20

## 2025-03-31 NOTE — PROGRESS NOTES
Medication Management Service, Warfarin Management  [facility] Medication Management, [phone]  Visit Date: 4/1/2025   Subjective:   Abbey Romo is a 59 y.o. female who presents to clinic today for anticoagulation monitoring and adjustment.    Patient was referred for warfarin management due to  Indication:   atrial fibrillation and mechanical aortic valve.   INR goal: of 2.0-3.0.  Duration of therapy: indefinite.    Patient reports the following:   Adherent with regimen:  Yes  Missed or extra doses:   held dose 3/26 as directed at last appointment      Bleeding or thromboembolic side effects:  None    Significant medication, dietary, alcohol, or tobacco changes:  Fully reviewed patient's medication list. Of note, patient mentioned she started taking fish oil this year. This can possibly increase INR. Today is patient's first visit with me personally, after switching to our clinic from [facility] in December. Patient previously reported stable INRs with less frequent monitoring but since coming to this clinic INR has been fairly labile. Patient reports she eats very similar diet each day which does include a salad with spinach for lunch. This is unchanged recently. Denies alcohol intake recently, rarely drinks alcohol. Denies grapefruit or cranberry intake. Does not utilize protein shakes. Has not changed her multivitamin recently.    Significant recent illness, disease state changes, or hospitalization:  None    Upcoming surgeries or procedures:  June - pacemaker removal, will be having leadless pacemaker placed - not scheduled yet . Patient reports for previous pacemaker placement she was off warfarin but did not have to bridge with Lovenox.           Assessment and PLAN   PT/INR done in office per protocol.     INR today is 4, supratherapeutic in spite of dose decrease at last visit.    Plan:  Skip warfarin dose today, then start reduced dose of warfarin 7.5 mg on Mondays and 6 mg all other days.

## 2025-04-01 ENCOUNTER — ANTI-COAG VISIT (OUTPATIENT)
Age: 60
End: 2025-04-01
Payer: COMMERCIAL

## 2025-04-01 DIAGNOSIS — I48.91 ATRIAL FIBRILLATION, UNSPECIFIED TYPE (HCC): ICD-10-CM

## 2025-04-01 DIAGNOSIS — Z95.2 HX OF AORTIC VALVE REPLACEMENT: Primary | ICD-10-CM

## 2025-04-01 LAB
INTERNATIONAL NORMALIZATION RATIO, POC: 4
PROTHROMBIN TIME, POC: 47.8

## 2025-04-01 PROCEDURE — 85610 PROTHROMBIN TIME: CPT

## 2025-04-01 PROCEDURE — 99213 OFFICE O/P EST LOW 20 MIN: CPT

## 2025-04-01 RX ORDER — LORATADINE 10 MG/1
10 TABLET ORAL PRN
COMMUNITY

## 2025-04-01 RX ORDER — MAGNESIUM GLUCONATE 27 MG(500)
500 TABLET ORAL NIGHTLY
COMMUNITY

## 2025-04-01 RX ORDER — BETAMETHASONE DIPROPIONATE 0.5 MG/G
CREAM TOPICAL PRN
COMMUNITY
Start: 2025-02-12

## 2025-04-10 NOTE — PROGRESS NOTES
Medication Management Service, Warfarin Management  [facility] Medication Management, [phone]  Visit Date: 2025   Subjective:   Abbey Romo is a 59 y.o. female who presents to clinic today for anticoagulation monitoring and adjustment.    Patient was referred for warfarin management due to  Indication:   atrial fibrillation and mechanical aortic valve.   INR goal: of 2.0-3.0.  Duration of therapy: indefinite.    Patient reports the following:   Adherent with regimen:  Yes  Missed or extra doses:  None   Bleeding or thromboembolic side effects:  None    Significant medication, dietary, alcohol, or tobacco changes:   patient states she ensured her diet was typical for her since last appointment    Significant recent illness, disease state changes, or hospitalization:  None  Upcoming surgeries or procedures:  None           Assessment and PLAN   PT/INR done in office per protocol.     INR today is 2.4, therapeutic.    Plan:  Instructed the patient to continue the current warfarin regimen of 7.5 mg Mon and 6 mg on all other days.  Using warfarin 3 mg tablets.    Recheck INR in 3 week(s).     Patient verbalized understanding of dosing directions and information discussed. Dosing schedule given to patient. Progress note sent to referring office.  Patient acknowledges working in consult agreement with pharmacist as referred by his/her physician.      Electronically signed by Lynda Euceda RPH on 4/10/25 at 2:00 PM EDT    For Pharmacy Admin Tracking Only    Intervention Detail: Adherence Monitorin  Total # of Interventions Recommended: 1  Total # of Interventions Accepted: 1  Time Spent (min): 20

## 2025-04-11 ENCOUNTER — ANTI-COAG VISIT (OUTPATIENT)
Age: 60
End: 2025-04-11
Payer: COMMERCIAL

## 2025-04-11 DIAGNOSIS — Z95.2 HX OF AORTIC VALVE REPLACEMENT: Primary | ICD-10-CM

## 2025-04-11 DIAGNOSIS — I48.91 ATRIAL FIBRILLATION, UNSPECIFIED TYPE (HCC): ICD-10-CM

## 2025-04-11 LAB
INTERNATIONAL NORMALIZATION RATIO, POC: 2.4
PROTHROMBIN TIME, POC: 28.7

## 2025-04-11 PROCEDURE — 85610 PROTHROMBIN TIME: CPT

## 2025-04-11 PROCEDURE — 99211 OFF/OP EST MAY X REQ PHY/QHP: CPT

## 2025-05-01 NOTE — PROGRESS NOTES
Medication Management Service, Warfarin Management  [facility] Medication Management, [phone]  Visit Date: 2025   Subjective:   Abbey Romo is a 59 y.o. female who presents to clinic today for anticoagulation monitoring and adjustment.    Patient was referred for warfarin management due to  Indication:   atrial fibrillation and mechanical aortic valve.   INR goal: of 2.0-3.0.  Duration of therapy: indefinite.    Patient reports the following:   Adherent with regimen:  Yes  Missed or extra doses:  None   Bleeding or thromboembolic side effects:  None    Significant medication, dietary, alcohol, or tobacco changes:  None. Has been maintaining her salad intake which she will continue.    Significant recent illness, disease state changes, or hospitalization:  None    Upcoming surgeries or procedures:  scheduled for a leadless pacemaker placement  in [place], however she is not sure she is going to do this, she has an appointment upcoming with [facility] and may have a provider there do this procedure, she will let clinic know of decision/plans           Assessment and PLAN   PT/INR done in office per protocol.     INR today is 3, therapeutic.    Plan:  Instructed the patient to continue the current warfarin regimen of 7.5 mg Mon and 6 mg on all other days.  Using warfarin 3 mg tablets.    Recheck INR in 4 week(s).     Patient verbalized understanding of dosing directions and information discussed. Dosing schedule given to patient. Progress note sent to referring office.  Patient acknowledges working in consult agreement with pharmacist as referred by his/her physician.      Electronically signed by Lynda Euceda RPH on 25 at 9:55 AM EDT    For Pharmacy Admin Tracking Only    Intervention Detail: Adherence Monitorin  Total # of Interventions Recommended: 1  Total # of Interventions Accepted: 1  Time Spent (min): 20

## 2025-05-02 ENCOUNTER — ANTI-COAG VISIT (OUTPATIENT)
Age: 60
End: 2025-05-02
Payer: COMMERCIAL

## 2025-05-02 DIAGNOSIS — I48.91 ATRIAL FIBRILLATION, UNSPECIFIED TYPE (HCC): ICD-10-CM

## 2025-05-02 DIAGNOSIS — Z95.2 HX OF AORTIC VALVE REPLACEMENT: Primary | ICD-10-CM

## 2025-05-02 LAB
INTERNATIONAL NORMALIZATION RATIO, POC: 3
PROTHROMBIN TIME, POC: 35.5

## 2025-05-02 PROCEDURE — 99211 OFF/OP EST MAY X REQ PHY/QHP: CPT

## 2025-05-02 PROCEDURE — 85610 PROTHROMBIN TIME: CPT

## 2025-05-29 NOTE — PROGRESS NOTES
Medication Management Service, Warfarin Management  [facility] Medication Management, [phone]  Visit Date: 2025   Subjective:   Abbey Romo is a 59 y.o. female who presents to clinic today for anticoagulation monitoring and adjustment.    Patient was referred for warfarin management due to  Indication:   atrial fibrillation and mechanical aortic valve.   INR goal: of 2.0-3.0.  Duration of therapy: indefinite.    Patient reports the following:   Adherent with regimen:  Yes  Missed or extra doses:  None   Bleeding or thromboembolic side effects:  None  Significant medication, dietary, alcohol, or tobacco changes:  None  Significant recent illness, disease state changes, or hospitalization:  None    Upcoming surgeries or procedures:   previously stated she had leadless pacemaker placement scheduled for  - states she is expecting a call from [facility] today. She states this procedure  will not happen, she is not yet sure which type of pacemaker she is going to have placed or when, will let clinic know           Assessment and PLAN   PT/INR done in office per protocol.     INR today is 2.7, therapeutic.    Plan:  Instructed the patient to continue the current warfarin regimen of 7.5 mg Mon and 6 mg on all other days.  Using warfarin 3 mg tablets.    Recheck INR in 4 week(s).     Patient verbalized understanding of dosing directions and information discussed. Dosing schedule given to patient. Progress note sent to referring office.  Patient acknowledges working in consult agreement with pharmacist as referred by his/her physician.      Electronically signed by Lynda Euceda RPH on 25 at 12:32 PM EDT    For Pharmacy Admin Tracking Only    Intervention Detail: Adherence Monitorin  Total # of Interventions Recommended: 1  Total # of Interventions Accepted: 1  Time Spent (min): 20

## 2025-05-30 ENCOUNTER — ANTI-COAG VISIT (OUTPATIENT)
Age: 60
End: 2025-05-30
Payer: COMMERCIAL

## 2025-05-30 DIAGNOSIS — I48.91 ATRIAL FIBRILLATION, UNSPECIFIED TYPE (HCC): ICD-10-CM

## 2025-05-30 DIAGNOSIS — Z95.2 HX OF AORTIC VALVE REPLACEMENT: Primary | ICD-10-CM

## 2025-05-30 LAB
INTERNATIONAL NORMALIZATION RATIO, POC: 2.7
PROTHROMBIN TIME, POC: 32.4

## 2025-05-30 PROCEDURE — 99211 OFF/OP EST MAY X REQ PHY/QHP: CPT

## 2025-05-30 PROCEDURE — 85610 PROTHROMBIN TIME: CPT

## 2025-06-03 ENCOUNTER — TELEPHONE (OUTPATIENT)
Age: 60
End: 2025-06-03

## 2025-06-03 NOTE — TELEPHONE ENCOUNTER
[facility] Medication Management Clinic Note  Patient called asking if black cohash would interact with warfarin because she would like to start black cohash for menopausal symptoms. Advised patient there should be no or very little interaction based on available data.    Lynda Grove, PharmD  [facility] [place]  6/3/2025 7:39 AM

## 2025-06-13 LAB
INR BLD: 2.4
PROTIME: NORMAL

## 2025-06-25 ENCOUNTER — HOSPITAL ENCOUNTER (OUTPATIENT)
Age: 60
Setting detail: SPECIMEN
Discharge: HOME | End: 2025-06-25

## 2025-06-26 LAB — BNP SERPL-MCNC: 103 PG/ML (ref 0–125)

## 2025-06-27 ENCOUNTER — ANTI-COAG VISIT (OUTPATIENT)
Age: 60
End: 2025-06-27
Payer: COMMERCIAL

## 2025-06-27 DIAGNOSIS — I48.91 ATRIAL FIBRILLATION, UNSPECIFIED TYPE (HCC): ICD-10-CM

## 2025-06-27 DIAGNOSIS — Z95.2 HX OF AORTIC VALVE REPLACEMENT: Primary | ICD-10-CM

## 2025-06-27 LAB
INTERNATIONAL NORMALIZATION RATIO, POC: 2.4
PROTHROMBIN TIME, POC: 28.2

## 2025-06-27 PROCEDURE — 99211 OFF/OP EST MAY X REQ PHY/QHP: CPT

## 2025-06-27 PROCEDURE — 85610 PROTHROMBIN TIME: CPT

## 2025-06-27 NOTE — PROGRESS NOTES
Medication Management Service, Warfarin Management  [facility] Medication Management, [phone]  Visit Date: 2025   Subjective:   Abbey Romo is a 59 y.o. female who presents to clinic today for anticoagulation monitoring and adjustment.    Patient was referred for warfarin management due to  Indication:   atrial fibrillation and mechanical aortic valve.   INR goal: of 2.0-3.0.  Duration of therapy: indefinite.    Patient reports the following:   Adherent with regimen:  Yes  Missed or extra doses:  None   Bleeding or thromboembolic side effects:  None  Significant medication, dietary, alcohol, or tobacco changes:  None  Significant recent illness, disease state changes, or hospitalization:  None    Upcoming surgeries or procedures:  still awaiting plans regarding upcoming pacemaker placement - patient will call clinic when she finds out more. Recent LA and labs at [facility].           Assessment and PLAN   PT/INR done in office per protocol.     INR today is 2.4, therapeutic.    Plan:  Instructed the patient to continue the current warfarin regimen of 7.5 mg Mon and 6 mg on all other days.  Using warfarin 3 mg tablets.    Recheck INR scheduled for 4 weeks although this may change if patient has procedure scheduled sooner    Patient verbalized understanding of dosing directions and information discussed. Dosing schedule given to patient. Progress note sent to referring office.  Patient acknowledges working in consult agreement with pharmacist as referred by his/her physician.      Electronically signed by Lynda Euceda RPH on 25 at 7:32 AM EDT    For Pharmacy Admin Tracking Only    Intervention Detail: Adherence Monitorin  Total # of Interventions Recommended: 1  Total # of Interventions Accepted: 1  Time Spent (min): 20

## 2025-07-25 ENCOUNTER — ANTI-COAG VISIT (OUTPATIENT)
Age: 60
End: 2025-07-25
Payer: COMMERCIAL

## 2025-07-25 DIAGNOSIS — I48.91 ATRIAL FIBRILLATION, UNSPECIFIED TYPE (HCC): ICD-10-CM

## 2025-07-25 DIAGNOSIS — Z95.2 HX OF AORTIC VALVE REPLACEMENT: Primary | ICD-10-CM

## 2025-07-25 LAB
INTERNATIONAL NORMALIZATION RATIO, POC: 2.2
PROTHROMBIN TIME, POC: 27

## 2025-07-25 PROCEDURE — 99211 OFF/OP EST MAY X REQ PHY/QHP: CPT

## 2025-07-25 PROCEDURE — 85610 PROTHROMBIN TIME: CPT

## 2025-07-25 RX ORDER — CEPHALEXIN 500 MG/1
500 CAPSULE ORAL 2 TIMES DAILY
COMMUNITY
Start: 2025-07-24 | End: 2025-07-31

## 2025-07-25 NOTE — PROGRESS NOTES
Medication Management Service, Warfarin Management  [facility] Medication Management, [phone]  Visit Date: 2025   Subjective:   Abbey Romo is a 59 y.o. female who presents to clinic today for anticoagulation monitoring and adjustment.    Patient was referred for warfarin management due to  Indication:   atrial fibrillation and mechanical aortic valve.   INR goal: of 2.0-3.0.  Duration of therapy: indefinite.    Patient reports the following:   Adherent with regimen:  Yes  Missed or extra doses:  None   Bleeding or thromboembolic side effects:  None    Significant medication, dietary, alcohol, or tobacco changes:  started Keflex yesterday for UTI and presumed strep, no other medication changes, would not expect significant impact on INR. Diet unchanged.    Significant recent illness, disease state changes, or hospitalization:  sore throat and UTI    Upcoming surgeries or procedures:  none - of note, no plans for pacemaker procedure discussed previously at this time.           Assessment and PLAN   PT/INR done in office per protocol.     INR today is 2.2, therapeutic.    Plan:  Instructed the patient to continue the current warfarin regimen of 7.5 mg Mon and 6 mg on all other days.  Using warfarin 3 mg tablets.    Recheck INR in 6 week(s).     Patient verbalized understanding of dosing directions and information discussed. Dosing schedule given to patient. Progress note sent to referring office.  Patient acknowledges working in consult agreement with pharmacist as referred by his/her physician.      Electronically signed by Lynda Euceda RPH on 25 at 7:32 AM EDT    For Pharmacy Admin Tracking Only    Intervention Detail: Adherence Monitorin  Total # of Interventions Recommended: 2  Total # of Interventions Accepted: 2  Time Spent (min): 20

## 2025-09-05 ENCOUNTER — ANTI-COAG VISIT (OUTPATIENT)
Age: 60
End: 2025-09-05
Payer: COMMERCIAL

## 2025-09-05 DIAGNOSIS — Z95.2 HX OF AORTIC VALVE REPLACEMENT: Primary | ICD-10-CM

## 2025-09-05 DIAGNOSIS — I48.91 ATRIAL FIBRILLATION, UNSPECIFIED TYPE (HCC): ICD-10-CM

## 2025-09-05 LAB
INTERNATIONAL NORMALIZATION RATIO, POC: 2.4
PROTHROMBIN TIME, POC: 28.7

## 2025-09-05 PROCEDURE — 85610 PROTHROMBIN TIME: CPT

## 2025-09-05 PROCEDURE — 99211 OFF/OP EST MAY X REQ PHY/QHP: CPT
